# Patient Record
Sex: FEMALE | Race: BLACK OR AFRICAN AMERICAN | Employment: UNEMPLOYED | URBAN - METROPOLITAN AREA
[De-identification: names, ages, dates, MRNs, and addresses within clinical notes are randomized per-mention and may not be internally consistent; named-entity substitution may affect disease eponyms.]

---

## 2020-02-01 ENCOUNTER — HOSPITAL ENCOUNTER (INPATIENT)
Age: 20
LOS: 9 days | Discharge: HOME | DRG: 750 | End: 2020-02-10
Attending: PSYCHIATRY & NEUROLOGY | Admitting: PSYCHIATRY & NEUROLOGY
Payer: COMMERCIAL

## 2020-02-01 PROBLEM — F20.0 SCHIZOPHRENIA, PARANOID (HCC): Status: ACTIVE | Noted: 2020-02-01

## 2020-02-01 PROCEDURE — 74011250637 HC RX REV CODE- 250/637: Performed by: PSYCHIATRY & NEUROLOGY

## 2020-02-01 PROCEDURE — 65220000003 HC RM SEMIPRIVATE PSYCH

## 2020-02-01 RX ORDER — DIPHENHYDRAMINE HYDROCHLORIDE 50 MG/ML
50 INJECTION, SOLUTION INTRAMUSCULAR; INTRAVENOUS
Status: DISCONTINUED | OUTPATIENT
Start: 2020-02-01 | End: 2020-02-03

## 2020-02-01 RX ORDER — SERTRALINE HYDROCHLORIDE 50 MG/1
50 TABLET, FILM COATED ORAL DAILY
COMMUNITY
Start: 2020-02-01 | End: 2020-02-10

## 2020-02-01 RX ORDER — TRAZODONE HYDROCHLORIDE 50 MG/1
50 TABLET ORAL
Status: DISCONTINUED | OUTPATIENT
Start: 2020-02-01 | End: 2020-02-10 | Stop reason: HOSPADM

## 2020-02-01 RX ORDER — OLANZAPINE 5 MG/1
5 TABLET ORAL
Status: DISCONTINUED | OUTPATIENT
Start: 2020-02-01 | End: 2020-02-10 | Stop reason: HOSPADM

## 2020-02-01 RX ORDER — OXCARBAZEPINE 300 MG/1
300 TABLET, FILM COATED ORAL 2 TIMES DAILY
COMMUNITY
Start: 2020-02-01 | End: 2020-02-10

## 2020-02-01 RX ORDER — OXCARBAZEPINE 150 MG/1
300 TABLET, FILM COATED ORAL 2 TIMES DAILY
Status: DISCONTINUED | OUTPATIENT
Start: 2020-02-01 | End: 2020-02-10 | Stop reason: HOSPADM

## 2020-02-01 RX ORDER — HYDROXYZINE 25 MG/1
50 TABLET, FILM COATED ORAL
Status: DISCONTINUED | OUTPATIENT
Start: 2020-02-01 | End: 2020-02-03

## 2020-02-01 RX ORDER — TRAZODONE HYDROCHLORIDE 50 MG/1
50 TABLET ORAL
COMMUNITY
End: 2020-02-10

## 2020-02-01 RX ORDER — ADHESIVE BANDAGE
30 BANDAGE TOPICAL DAILY PRN
Status: DISCONTINUED | OUTPATIENT
Start: 2020-02-01 | End: 2020-02-03

## 2020-02-01 RX ORDER — QUETIAPINE FUMARATE 300 MG/1
300 TABLET, FILM COATED ORAL
Status: ON HOLD | COMMUNITY
End: 2020-02-02

## 2020-02-01 RX ORDER — ACETAMINOPHEN 325 MG/1
650 TABLET ORAL
Status: DISCONTINUED | OUTPATIENT
Start: 2020-02-01 | End: 2020-02-03

## 2020-02-01 RX ORDER — BENZTROPINE MESYLATE 1 MG/1
1 TABLET ORAL
Status: DISCONTINUED | OUTPATIENT
Start: 2020-02-01 | End: 2020-02-03

## 2020-02-01 RX ORDER — ARIPIPRAZOLE LAUROXIL 882 MG/3.2ML
3.2 INJECTION, SUSPENSION, EXTENDED RELEASE INTRAMUSCULAR
COMMUNITY
Start: 2020-01-30 | End: 2020-02-10

## 2020-02-01 RX ORDER — LORAZEPAM 2 MG/ML
1 INJECTION INTRAMUSCULAR
Status: DISCONTINUED | OUTPATIENT
Start: 2020-02-01 | End: 2020-02-03

## 2020-02-01 RX ORDER — HALOPERIDOL 5 MG/ML
5 INJECTION INTRAMUSCULAR
Status: DISCONTINUED | OUTPATIENT
Start: 2020-02-01 | End: 2020-02-03

## 2020-02-01 RX ORDER — HYDROXYZINE PAMOATE 25 MG/1
25 CAPSULE ORAL
COMMUNITY
Start: 2020-01-28 | End: 2020-02-10

## 2020-02-01 RX ADMIN — QUETIAPINE FUMARATE 300 MG: 200 TABLET ORAL at 22:27

## 2020-02-01 RX ADMIN — OXCARBAZEPINE 300 MG: 150 TABLET, FILM COATED ORAL at 19:32

## 2020-02-01 RX ADMIN — HYDROXYZINE HYDROCHLORIDE 50 MG: 25 TABLET, FILM COATED ORAL at 19:32
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
## 2020-02-01 NOTE — PROGRESS NOTES
Problem: Anxiety-Behavioral Health (Adult/Pediatric)  Goal: *STG: Remains safe in hospital  Outcome: Progressing Towards Goal  Goal: *STG: Seeks staff when feelings of anxiety and fear arise  Outcome: Progressing Towards Goal
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
## 2020-02-02 PROCEDURE — 65220000003 HC RM SEMIPRIVATE PSYCH

## 2020-02-02 PROCEDURE — 74011250637 HC RX REV CODE- 250/637: Performed by: PSYCHIATRY & NEUROLOGY

## 2020-02-02 RX ORDER — QUETIAPINE 300 MG/1
300 TABLET, FILM COATED, EXTENDED RELEASE ORAL
Status: ON HOLD | COMMUNITY
End: 2020-02-03

## 2020-02-02 RX ORDER — QUETIAPINE 300 MG/1
300 TABLET, FILM COATED, EXTENDED RELEASE ORAL
Status: DISCONTINUED | OUTPATIENT
Start: 2020-02-02 | End: 2020-02-05

## 2020-02-02 RX ADMIN — QUETIAPINE FUMARATE 300 MG: 300 TABLET, EXTENDED RELEASE ORAL at 21:02

## 2020-02-02 RX ADMIN — OXCARBAZEPINE 300 MG: 150 TABLET, FILM COATED ORAL at 09:04

## 2020-02-02 RX ADMIN — OXCARBAZEPINE 300 MG: 150 TABLET, FILM COATED ORAL at 16:56

## 2020-02-02 NOTE — PROGRESS NOTES
0900 Sitting in the dayroom talking with a peer. Social, pleasant mood. Smiling at times. Reports she feels depressed. \"Sort of. I can see myself dead. \" Cooperative with vital signs and medications. 1130 Pt in the dayroom eating lunch, talking with peers. 1330 Exercising in her bedroom. No issues voiced. Pleasant. [address] Talking with a peer in the dayroom. Calm, smiling. 1800 Pt ate dinner. Remains sitting in the dayroom talking and watching TV.

## 2020-02-02 NOTE — GROUP NOTE
[facility] GROUP DOCUMENTATION INDIVIDUAL Group Therapy Note Date: 2/1/2020 Group Start Time: 1500 Group End Time: 6845 Group Topic: Process Group - Inpatient [facility] Cherelle Mendoza [facility] GROUP DOCUMENTATION GROUP Group Therapy Note Attendees: 5 Attendance: Attended Patient's Goal:  Pt identified areas where they feel pride, love, areas that need assistance, and things that cause worry or concern. Interventions/techniques: Art integration, Challenged, Informed and Promoted peer support Follows Directions: Followed directions Interactions: Interacted appropriately Mental Status: Elevated Behavior/appearance: Cooperative Goals Achieved: Able to listen to others and Able to reflect/comment on own behavior Additional Notes:  Pt stated that she is often worried about those that love her betraying her, even though they never have. Pt stated \"they keep loving me and I don't know why\" Kendra Ashraf

## 2020-02-02 NOTE — BH NOTES
Behavioral Health Interdisciplinary Rounds     Patient Name: Luis Brock  Age: 21 y.o. Room/Bed:  314/01  Primary Diagnosis: [facility]   Admission Status: Voluntary     Readmission within 30 days: no  Power of  in place: no  Patient requires a blocked bed: no          Reason for blocked bed:     Sleep hours: 8       Participation in Care/Groups:  no  Medication Compliant?: Yes  PRNS (last 24 hours):  Antianxiety    Restraints (last 24 hours):  no  Substance Abuse:  no    24 hour chart check complete: yes     Patient goal(s) for today:   Treatment team focus/goals:   Progress note:     LOS:  1  Expected LOS:     Financial concerns/prescription coverage:  no  Family contact:       Family requesting physician contact today:  no  Discharge plan:   Access to weapons: no        Outpatient provider(s):   Patient's preferred phone number for follow up call:     Participating treatment team members: Luis Brock (assigned SW),

## 2020-02-02 NOTE — PROGRESS NOTES
Problem: Anxiety-Behavioral Health (Adult/Pediatric)  Goal: *STG: Remains safe in hospital  Outcome: Progressing Towards Goal  Goal: *STG/LTG: Complies with medication therapy  Outcome: Progressing Towards Goal

## 2020-02-02 NOTE — PROGRESS NOTES
Problem: Anxiety-Behavioral Health (Adult/Pediatric)  Goal: *STG: Participates in treatment plan  Outcome: Progressing Towards Goal  Goal: *STG: Remains safe in hospital  Outcome: Progressing Towards Goal  Goal: *STG: Seeks staff when feelings of anxiety and fear arise  Outcome: Progressing Towards Goal  Goal: *STG: Demonstrates decrease in ritualistic behavior  Outcome: Progressing Towards Goal  Goal: *STG: Demonstrates effective anxiety reduction strategies  Outcome: Progressing Towards Goal  Goal: *STG/LTG: Complies with medication therapy  Outcome: Progressing Towards Goal     Progressing.
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
## 2020-02-03 PROCEDURE — 65220000003 HC RM SEMIPRIVATE PSYCH

## 2020-02-03 PROCEDURE — 74011250637 HC RX REV CODE- 250/637: Performed by: PSYCHIATRY & NEUROLOGY

## 2020-02-03 RX ORDER — LORAZEPAM 2 MG/ML
1 INJECTION INTRAMUSCULAR
Status: DISCONTINUED | OUTPATIENT
Start: 2020-02-03 | End: 2020-02-10 | Stop reason: HOSPADM

## 2020-02-03 RX ORDER — BENZTROPINE MESYLATE 1 MG/1
1 TABLET ORAL
Status: DISCONTINUED | OUTPATIENT
Start: 2020-02-03 | End: 2020-02-03

## 2020-02-03 RX ORDER — QUETIAPINE FUMARATE 300 MG/1
300 TABLET, FILM COATED ORAL
COMMUNITY
End: 2020-02-10

## 2020-02-03 RX ORDER — HALOPERIDOL 5 MG/ML
5 INJECTION INTRAMUSCULAR
Status: DISCONTINUED | OUTPATIENT
Start: 2020-02-03 | End: 2020-02-10 | Stop reason: HOSPADM

## 2020-02-03 RX ORDER — ADHESIVE BANDAGE
30 BANDAGE TOPICAL DAILY PRN
Status: DISCONTINUED | OUTPATIENT
Start: 2020-02-03 | End: 2020-02-03

## 2020-02-03 RX ORDER — ACETAMINOPHEN 325 MG/1
650 TABLET ORAL
Status: DISCONTINUED | OUTPATIENT
Start: 2020-02-03 | End: 2020-02-10 | Stop reason: HOSPADM

## 2020-02-03 RX ORDER — HYDROXYZINE 25 MG/1
50 TABLET, FILM COATED ORAL
Status: DISCONTINUED | OUTPATIENT
Start: 2020-02-03 | End: 2020-02-03

## 2020-02-03 RX ORDER — HALOPERIDOL 5 MG/ML
5 INJECTION INTRAMUSCULAR
Status: DISCONTINUED | OUTPATIENT
Start: 2020-02-03 | End: 2020-02-03

## 2020-02-03 RX ORDER — DIPHENHYDRAMINE HYDROCHLORIDE 50 MG/ML
50 INJECTION, SOLUTION INTRAMUSCULAR; INTRAVENOUS
Status: DISCONTINUED | OUTPATIENT
Start: 2020-02-03 | End: 2020-02-03

## 2020-02-03 RX ORDER — LORAZEPAM 2 MG/ML
1 INJECTION INTRAMUSCULAR
Status: DISCONTINUED | OUTPATIENT
Start: 2020-02-03 | End: 2020-02-03

## 2020-02-03 RX ORDER — TRAZODONE HYDROCHLORIDE 50 MG/1
50 TABLET ORAL
Status: DISCONTINUED | OUTPATIENT
Start: 2020-02-03 | End: 2020-02-03

## 2020-02-03 RX ORDER — OLANZAPINE 5 MG/1
5 TABLET ORAL
Status: DISCONTINUED | OUTPATIENT
Start: 2020-02-03 | End: 2020-02-03

## 2020-02-03 RX ORDER — DIPHENHYDRAMINE HYDROCHLORIDE 50 MG/ML
50 INJECTION, SOLUTION INTRAMUSCULAR; INTRAVENOUS
Status: DISCONTINUED | OUTPATIENT
Start: 2020-02-03 | End: 2020-02-10 | Stop reason: HOSPADM

## 2020-02-03 RX ORDER — ACETAMINOPHEN 325 MG/1
650 TABLET ORAL
Status: DISCONTINUED | OUTPATIENT
Start: 2020-02-03 | End: 2020-02-03

## 2020-02-03 RX ORDER — BENZTROPINE MESYLATE 1 MG/1
1 TABLET ORAL 2 TIMES DAILY
Status: DISCONTINUED | OUTPATIENT
Start: 2020-02-03 | End: 2020-02-03

## 2020-02-03 RX ORDER — HYDROXYZINE 25 MG/1
50 TABLET, FILM COATED ORAL
Status: DISCONTINUED | OUTPATIENT
Start: 2020-02-03 | End: 2020-02-10 | Stop reason: HOSPADM

## 2020-02-03 RX ORDER — ADHESIVE BANDAGE
30 BANDAGE TOPICAL DAILY PRN
Status: DISCONTINUED | OUTPATIENT
Start: 2020-02-03 | End: 2020-02-10 | Stop reason: HOSPADM

## 2020-02-03 RX ORDER — BENZTROPINE MESYLATE 1 MG/1
1 TABLET ORAL
Status: DISCONTINUED | OUTPATIENT
Start: 2020-02-03 | End: 2020-02-10 | Stop reason: HOSPADM

## 2020-02-03 RX ADMIN — HYDROXYZINE HYDROCHLORIDE 50 MG: 25 TABLET, FILM COATED ORAL at 21:08

## 2020-02-03 RX ADMIN — TRAZODONE HYDROCHLORIDE 50 MG: 50 TABLET ORAL at 21:08

## 2020-02-03 RX ADMIN — OXCARBAZEPINE 300 MG: 150 TABLET, FILM COATED ORAL at 08:40

## 2020-02-03 RX ADMIN — OXCARBAZEPINE 300 MG: 150 TABLET, FILM COATED ORAL at 16:22

## 2020-02-03 RX ADMIN — MAGNESIUM HYDROXIDE 30 ML: 400 SUSPENSION ORAL at 21:08

## 2020-02-03 RX ADMIN — QUETIAPINE FUMARATE 300 MG: 300 TABLET, EXTENDED RELEASE ORAL at 21:08

## 2020-02-03 NOTE — PROGRESS NOTES
[facility] Admission Pharmacy Medication Reconciliation     Recommendations/Findings:   1) Per pharmacist at [facility], patient received Aristada Initio 675 mg and Aristada 882 mg IM injections on 1/23/20 while admitted at their facility. The following changes were made to the PTA medication list:    Additions:   o None   Modifications:   o Dosage form of quetiapine changed from ER to immediate release  o Hydroxyzine frequency changed to QID PRN  o Frequency added for sertraline  o Frequency added for Aristada   Deletions:   o None    Total Time Spent: 60 minutes    Information obtained from: Discharge summary from [facility], [facility] inpatient pharmacy, [facility] [facility] [place]    Patient allergies: Allergies as of 02/01/2020    (No Known Allergies)       Prior to Admission Medications   Prescriptions Last Dose Informant Patient Reported? Taking? ARISTADA 882 mg/3.2 mL injection 1/23/2020 Other Yes Yes   Sig: 3.2 mL by IntraMUSCular route every thirty (30) days. Indications: schizophrenia   OXcarbazepine (TRILEPTAL) 300 mg tablet  Other Yes Yes   Sig: Take 300 mg by mouth two (2) times a day. QUEtiapine (SEROQUEL) 300 mg tablet   Yes Yes   Sig: Take 300 mg by mouth nightly. Indications: schizophrenia   hydrOXYzine pamoate (VISTARIL) 25 mg capsule  Other Yes Yes   Sig: Take 25 mg by mouth four (4) times daily as needed for Anxiety. sertraline (ZOLOFT) 50 mg tablet  Other Yes Yes   Sig: Take 50 mg by mouth daily. Indications: anxiousness associated with depression   traZODone (DESYREL) 50 mg tablet  Other Yes Yes   Sig: Take 50 mg by mouth nightly.       Facility-Administered Medications: None       Thank you,  [facility]  Desk: [phone] ([id number])  Pharmacy: [phone] ([id number])
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
## 2020-02-03 NOTE — PROGRESS NOTES
Problem: Anxiety-Behavioral Health (Adult/Pediatric)  Goal: *STG: Participates in treatment plan  Outcome: Progressing Towards Goal  Goal: *STG: Remains safe in hospital  Outcome: Progressing Towards Goal  Goal: *STG: Attends activities and groups  Outcome: Progressing Towards Goal  Goal: *STG: Demonstrates effective anxiety reduction strategies  Outcome: Progressing Towards Goal     Problem: Falls - Risk of  Goal: *Absence of Falls  Description  Document Raudel Fall Risk and appropriate interventions in the flowsheet.   Outcome: Progressing Towards Goal  Note:   Fall Risk Interventions: Patient wearing non-slip socks with grippers

## 2020-02-03 NOTE — BH NOTES
1930  Received report from REID DAMON. Assumed care of the patient. 2000  Pt in room sitting on her bed. Pt reports mood as \"fine. \"  Pt currently denies anxiety/depression/SI/HI/AVH/pain. Pt asking for evening medication, informed her I could give it to her at 2100 and pt verbalized understanding. 2100  Pt in day flynn watching the super bowl with peers. Pt compliant with evening medication. 2200  Pt in day flynn watching tv    2300  Pt in bed asleep    0000  Pt in bed asleep    0100  Pt in bed asleep    0200  Pt in bed asleep    0300  Pt in bed asleep    0400  Pt in bed asleep    0500  Pt in bed asleep    0600  Pt in bed asleep    0630  Pt has slept approximately 7 1/2 hours so far this shift.

## 2020-02-03 NOTE — GROUP NOTE
GRACIE  GROUP DOCUMENTATION INDIVIDUAL Group Therapy Note Date: 2/3/2020 Group Start Time: 1500 Group End Time: 0164 Group Topic: Recreational/Music Therapy [address] [facility] 3 ACUTE BEHAV [facility] [place], [address] [place], Susan. \Hospital Has a New Name and Outlook.\"" GROUP DOCUMENTATION GROUP Group Therapy Note Attendees: 6 Attendance: Did not attend Patient's Goal: Interventions/techniques: 
Sabina Weston

## 2020-02-03 NOTE — BH NOTES
GROUP THERAPY PROGRESS NOTE    Patient is participating in coping skills group. Group time: 50 minutes    Personal goal for participation: Learn coping skills to reduce negative emotions    Goal orientation: Personal    Group therapy participation: active    Therapeutic interventions reviewed and discussed: Group discussion on why being bored can be a problem and the consequences of boredom that patient have experienced. Patient discussed issues they have had with being bored and ways they have tried to combat boredom. This lead to discussion of coping skills for negative emotions and ways to feel better that each patient has tried or that they have heard of. Discussion of activities that help with boredom, challenges, potential solutions and plans. Impression of participation: Kay Handley was present for group. She reported that she enjoys listening to music. She was quiet with little interaction with others during group.     Tom Bahena LPC [facility]
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
## 2020-02-03 NOTE — BH NOTES
[id number] Patient in day room playing games with peers. Medication compliant. Intermittently speaking with staff and peers. Patient doing situps in room and discussing frequency and importance of exercising. [id number] Patient ambulating in hallway, talking with staff. Patient attended groups and appears interactive in discussions. No adverse behaviors noted at this time. [phone] Patient ate her lunch without problem. She is ambulating between room and day room quietly. Responding to internal stimuli, auditory hallucinations. Patient's affect is pleasant and calm. Whispering quietly to self.  [id number] Patient is calm and pleasant, very polite to staff. Patient participated in group and spoke with pharmacist. No adverse behaviors noted. 1310 Patient pacing in room responding to internal stimuli. Conversation is appropriate for setting and calm. [id number] Patient resting in bed. Medication compliant. No adverse behaviors at this time. 1800 Patient ambulating between hallway and room. Responding to internal stimuli verbally and shadow boxing. Patient does not appear agitated. Intermittently laughing and speaking.

## 2020-02-03 NOTE — PROGRESS NOTES
Laboratory monitoring for mood stabilizer and antipsychotics:    Recommended baseline monitoring has been completed based on this patient's current medication regimen. The following labs were completed at transferring facility on 1/31/20: CMP, CBC, BAL, urine drug screen (negative), urinalysis, urine HCG (negative). Please see records in paper chart in nurse's station/scanned in media tab for results. Per [facility] tab, last A1c was 5.4% on 11/21/19. The patient is currently taking the following medication(s):   Current Facility-Administered Medications   Medication Dose Route Frequency    nicotine (NICODERM CQ) 7 mg/24 hr patch 1 Patch  1 Patch TransDERmal DAILY    [START ON 2/21/2020] ARIPiprazole lauroxil (ARISTADA) 882 mg/3.2 mL ER injection 882 mg  882 mg IntraMUSCular Q30D    QUEtiapine SR (SEROquel XR) tablet 300 mg  300 mg Oral QHS    OXcarbazepine (TRILEPTAL) tablet 300 mg  300 mg Oral BID       Height, Weight, BMI Estimation  Estimated body mass index is 37.01 kg/m² as calculated from the following:    Height as of this encounter: 172.7 cm (68\"). Weight as of this encounter: 110.4 kg (243 lb 6.4 oz).      Renal Function, Hepatic Function and Chemistry  See paperwork in chart/scanned in media tab for results    Hematology  See paperwork in chart/scanned in media tab for results    Lipids  Lab Results   Component Value Date/Time    Cholesterol, total 147 02/04/2020 05:15 AM    HDL Cholesterol 54 02/04/2020 05:15 AM    LDL, calculated 80.6 02/04/2020 05:15 AM    Triglyceride 62 02/04/2020 05:15 AM    CHOL/HDL Ratio 2.7 02/04/2020 05:15 AM       Thyroid Function  Lab Results   Component Value Date/Time    TSH 1.76 02/04/2020 05:15 AM     Vitals  Visit Vitals  /82 (BP 1 Location: Right arm, BP Patient Position: Sitting)   Pulse 61   Temp 98.3 °F (36.8 °C)   Resp 18   Ht 172.7 cm (68\")   Wt 110.4 kg (243 lb 6.4 oz)   SpO2 100%   Breastfeeding No   BMI 37.01 kg/m²     Kandi Contreras PharmD, BCPS  [phone] (pharmacy)

## 2020-02-03 NOTE — GROUP NOTE
GRACIE  GROUP DOCUMENTATION INDIVIDUAL Group Therapy Note Date: 2/3/2020 Group Start Time: 1100 Group End Time: 1200 Group Topic: Topic Group [facility] 3 ACUTE BEHAV [facility], [address] [facility] GROUP DOCUMENTATION GROUP Group Therapy Note Attendees: 6 Attendance: Did not attend Patient's Goal: Interventions/techniques [place]

## 2020-02-04 LAB
CHOLEST SERPL-MCNC: 147 MG/DL
HDLC SERPL-MCNC: 54 MG/DL
HDLC SERPL: 2.7 {RATIO} (ref 0–5)
LDLC SERPL CALC-MCNC: 80.6 MG/DL (ref 0–100)
LIPID PROFILE,FLP: NORMAL
TRIGL SERPL-MCNC: 62 MG/DL (ref ?–150)
TSH SERPL DL<=0.05 MIU/L-ACNC: 1.76 UIU/ML (ref 0.36–3.74)
VLDLC SERPL CALC-MCNC: 12.4 MG/DL

## 2020-02-04 PROCEDURE — 80061 LIPID PANEL: CPT

## 2020-02-04 PROCEDURE — 84443 ASSAY THYROID STIM HORMONE: CPT

## 2020-02-04 PROCEDURE — 36415 COLL VENOUS BLD VENIPUNCTURE: CPT

## 2020-02-04 PROCEDURE — 65220000003 HC RM SEMIPRIVATE PSYCH

## 2020-02-04 PROCEDURE — 74011250637 HC RX REV CODE- 250/637: Performed by: PSYCHIATRY & NEUROLOGY

## 2020-02-04 RX ORDER — NICOTINE 7MG/24HR
1 PATCH, TRANSDERMAL 24 HOURS TRANSDERMAL DAILY
Status: DISCONTINUED | OUTPATIENT
Start: 2020-02-04 | End: 2020-02-10 | Stop reason: HOSPADM

## 2020-02-04 RX ADMIN — OXCARBAZEPINE 300 MG: 150 TABLET, FILM COATED ORAL at 08:33

## 2020-02-04 RX ADMIN — QUETIAPINE FUMARATE 300 MG: 300 TABLET, EXTENDED RELEASE ORAL at 21:17

## 2020-02-04 RX ADMIN — OXCARBAZEPINE 300 MG: 150 TABLET, FILM COATED ORAL at 16:53

## 2020-02-04 RX ADMIN — TRAZODONE HYDROCHLORIDE 50 MG: 50 TABLET ORAL at 21:17

## 2020-02-04 RX ADMIN — HYDROXYZINE HYDROCHLORIDE 50 MG: 25 TABLET, FILM COATED ORAL at 21:17

## 2020-02-04 NOTE — BH NOTES
GROUP THERAPY PROGRESS NOTE    Patient is participating in Substance abuse group. Group time: 45 minutes    Personal goal for participation: To understand addiction and relapse and identify triggers that increase urges and relapse. Goal orientation: Personal    Group therapy participation: active    Therapeutic interventions reviewed and discussed: Group discussion of substance use, abuse, and dependence and the urge cycle. Patients were able to explore their own urges to use various substances including cigarettes, alcohol, heroin, cocaine, and others. Group discussed how they feel when they are unable to use and ways substance use has hindered their lives. Triggers for use and coping skills to avoid use or manage symptoms until craving subsides were discussed. Those without addiction issues used the urges handout to write down and draw pictures of things that make them anxious, angry, or sad. Impression of participation: Elena Medina was very active in group. When asked what negative emotions she feels she described rain drops. With encouragement and prompting she was able to share that she feels like a rainstorm inside of her at times. This was equal to sadness and depression. She was able to verbalize occasional storms and thunder but mostly rain.     Lorenza Moya LPC LSATP [facility]
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
## 2020-02-04 NOTE — PROGRESS NOTES
Problem: Tobacco Use  Goal: *Tobacco use abstinence  Outcome: Progressing Towards Goal  Goal: *Knowledge of tobacco use cessation methods  Outcome: Progressing Towards Goal
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
## 2020-02-04 NOTE — GROUP NOTE
GRACIE  GROUP DOCUMENTATION INDIVIDUAL Group Therapy Note Date: 2/4/2020 Group Start Time: 1100 Group End Time: 1200 Group Topic: Topic Group [address] ACUTE BEHAV [facility], [address] GROUP DOCUMENTATION GROUP Group Therapy Note Attendees: 4 Attendance: Attended Patient's Goal:  To participate in self esteem booster Interventions/techniques: Supported-handout Follows Directions: Followed directions Interactions: Interacted appropriately Mental Status: Calm Behavior/appearance: Attentive and Cooperative Goals Achieved: Able to engage in interactions, Able to listen to others, Discussed coping and Discussed self-esteem issues Additional Notes:   
 
Milena Gallegos

## 2020-02-04 NOTE — PROGRESS NOTES
Problem: Anxiety-Behavioral Health (Adult/Pediatric)  Goal: *STG/LTG: Complies with medication therapy  Outcome: Progressing Towards Goal
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
## 2020-02-04 NOTE — PROGRESS NOTES
Problem: Anxiety-Behavioral Health (Adult/Pediatric)  Goal: *STG: Demonstrates effective anxiety reduction strategies  Outcome: Progressing Towards Goal  Note:   Patient exercises frequently as a coping mechanism. Goal: *STG/LTG: Complies with medication therapy  Outcome: Progressing Towards Goal     Problem: Falls - Risk of  Goal: *Absence of Falls  Description  Document Clide Failing Fall Risk and appropriate interventions in the flowsheet. Outcome: Progressing Towards Goal  Note:   Fall Risk Interventions:        Patient consistently compliant with gripper socks.

## 2020-02-04 NOTE — GROUP NOTE
GRACIE  GROUP DOCUMENTATION INDIVIDUAL Group Therapy Note Date: 2/4/2020 Group Start Time: 1500 Group End Time: 4019 Group Topic: Recreational/Music Therapy [facility] [address] ACUTE BEHAV [facility] [place], [address] GROUP DOCUMENTATION GROUP Group Therapy Note Attendees: 7 Attendance: Attended Patient's Goal:  To concentrate on selected task Interventions/techniques: Supported-crafts,games,music Follows Directions: Followed directions Interactions: Interacted appropriately Mental Status: Calm Behavior/appearance: Attentive and Cooperative Goals Achieved: Able to engage in interactions and Able to listen to others-completed selected task Additional Notes:   
 
Roberto Larson

## 2020-02-04 NOTE — BH NOTES
1900- Assumed Care and received report from off going staff.     1922-At the beginning of the shift patient in the dayroom having a conversation with peers. Calm and cooperative. At this time denies SI,HI and AVH.    2030-Tolerated bedtime snack in the dayroom with peers. 2110-Showed signs in the the hallway of [address] [place] [address].     2108-Tolerated scheduled bedtime medication and PRN's for anxiety, sleep and constipation. 0530- labs drawn this am.      Q15 minutes rounds for safety continues. Resting in bed with eyes close. Approx.  7 hours of sleep

## 2020-02-05 PROCEDURE — 74011250637 HC RX REV CODE- 250/637: Performed by: PSYCHIATRY & NEUROLOGY

## 2020-02-05 PROCEDURE — 65220000003 HC RM SEMIPRIVATE PSYCH

## 2020-02-05 RX ORDER — QUETIAPINE 300 MG/1
600 TABLET, FILM COATED, EXTENDED RELEASE ORAL
Status: DISCONTINUED | OUTPATIENT
Start: 2020-02-05 | End: 2020-02-10 | Stop reason: HOSPADM

## 2020-02-05 RX ADMIN — OXCARBAZEPINE 300 MG: 150 TABLET, FILM COATED ORAL at 16:25

## 2020-02-05 RX ADMIN — OXCARBAZEPINE 300 MG: 150 TABLET, FILM COATED ORAL at 08:37

## 2020-02-05 RX ADMIN — QUETIAPINE FUMARATE 600 MG: 300 TABLET, EXTENDED RELEASE ORAL at 21:07
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
## 2020-02-05 NOTE — PROGRESS NOTES
Basilia Falcon  actively participated in Spirituality Group about Hope on the [facility] unit. .  Ankita Dent EdD MDiv     For  Assistance Page [phone]

## 2020-02-05 NOTE — BH NOTES
0700 Received report on patient and assumed care  [phone]  Patient denies SI/HI and delusions/hallucinations. Patient is pleasant and cooperative. Verbalized her goal for today as, \"improving attitude to get better. \" She was medication/diet compliant. Attended groups and participated. No adverse behaviors noted at this time. [id number] Patient is currently attending groups and displaying profound insight into personal struggles. 5198-0110 Patient attended groups, and interacted with peers appropriately. Tolerated lunch well. No adverse behaviors noted at this time. 3659-1252 Patient attended and participated in groups. She stated she had thoughts that she wanted to get down onto paper. Patient was was able to write down thoughts and verbally expressed, \"feeling much better now that she was able to get some things down on paper. \"  3240-4547 Patient ate dinner and is interacting with peers appropriately. Showing good insight, verbalizing that in order to be well once she is on the outside, she needs to make sure that she takes her medication regularly. No adverse behaviors noted at this time.

## 2020-02-05 NOTE — PROGRESS NOTES
Problem: Paranoid Ideation  Goal: *LTG: Show more trust in others by speaking positively of them & reporting comfort in socializing  Outcome: Progressing Towards Goal  Goal: *LTG: Interact with others without defensiveness or anger  Outcome: Progressing Towards Goal  Goal: *LTG: Report reduced vigilance & suspicion around others as well as more relaxed, trusting, & open interaction  Outcome: Progressing Towards Goal  Goal: *STG: Make verbal connection between fears of others & own feelings of inadequacy  Outcome: Progressing Towards Goal  Goal: *STG: Agree to collaborate with therapist in probing feelings of vulnerability  Outcome: Progressing Towards Goal  Goal: *STG: Stop being accusatory of others  Outcome: Progressing Towards Goal  Goal: *Patient Specific Goal (EDIT GOAL, INSERT TEXT)  Outcome: Progressing Towards Goal  Note:   Patient specific goal is to improve attitude in general.

## 2020-02-05 NOTE — BH NOTES
1900- Assumed Care and received report from off going staff.     1925-At the beginning of the shift patient in her room resting in bed with eyes close. Calm and cooperative. At this time denies SI,HI and AVH.    2030-Accepted bedtime snack      2117-Tolerated scheduled bedtime medication and PRN's for anxiety and sleep      Q15 minutes rounds for safety continues.  Resting in bed with eyes close.     Approx 8 hours of sleep

## 2020-02-05 NOTE — BH NOTES
GROUP THERAPY PROGRESS NOTE    Patient is participating in Coping Skills group. Group time: 45 minutes    Personal goal for participation: To come up with a plan of things to help maintain wellness of the whole person    Goal orientation: Personal    Group therapy participation: active    Therapeutic interventions reviewed and discussed: Group discussion of the wellness wheel and how they plan to balance themselves by looking at spiritual, physical, occupations, intellectual, social/family, and emotional aspects of their lives. Impression of participation: Malcolm Goodson shared that she needs to find a job but would like to go back to school to better herself and finds that she is not smart. Discussed that there are different types of emotional intelligence and maybe she needs to find what her learning style is.     Shay Oodm LPC LSATP CSAC

## 2020-02-05 NOTE — GROUP NOTE
GRACIE  GROUP DOCUMENTATION INDIVIDUAL Group Therapy Note Date: 2/5/2020 Group Start Time: 1100 Group End Time: 1200 Group Topic: Activity Therapy [facility] [place] [address] ACUTE BEHAV [facility], [address], [address]. \Hospital Has a New Name and Outlook.\"" GROUP DOCUMENTATION GROUP Group Therapy Note Attendees: 7 Attendance: Attended Patient's Goal:  To participate in relaxation activity Interventions/techniques: Supported-favorite ways to relax Follows Directions: Followed directions Interactions: Interacted appropriately Mental Status: Calm Behavior/appearance: Attentive, Cooperative and Needed prompting Goals Achieved: Able to engage in interactions and Able to listen to others Additional Notes:   
 
Fani Barbosa
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
## 2020-02-05 NOTE — BH NOTES
Behavioral Health Treatment Team Note     Patient goal(s) for today: Engage in unit activities. Treatment team focus/goals: Dispo planning. Progress note:  Pt is calm, cooperative and engaging in group activities. Pt states she will be staying in [place] and will find \"work\". No return calls from  or mother at this time. LOS:  4  Expected LOS: TBD     Financial concerns/prescription coverage:  medicaid   Date of last family contact: Unable to leave voicemail for mother Alessandra Harris [phone]     Family requesting physician contact today: No  Discharge plan: TBD  Guns in the home: None involved. Outpatient provider(s): [facility]. Laury Etienne [phone]    Participating treatment team members: Terrence Mcnally, JOSE ANTONIO Woods and Dr. Kirk Villalobos.

## 2020-02-05 NOTE — GROUP NOTE
GRACIE  GROUP DOCUMENTATION INDIVIDUAL Group Therapy Note Date: 2/5/2020 Group Start Time: 1000 Group End Time: 1100 Group Topic: Topic Group [facility] 3 ACUTE BEHAV [facility] [place], [address] GROUP DOCUMENTATION GROUP Group Therapy Note Attendees: 7 Attendance: Attended Patient's Goal:  To participate in mental health journey game Interventions/techniques: Supported-choices in recovery Follows Directions: Followed directions Interactions: Interacted appropriately Mental Status: Calm Behavior/appearance: Attentive and Cooperative Goals Achieved: Able to engage in interactions, Able to listen to others and Discussed coping Additional Notes:   
Verlan Schirmer

## 2020-02-05 NOTE — PROGRESS NOTES
Problem: Anxiety-Behavioral Health (Adult/Pediatric)  Goal: *STG: Seeks staff when feelings of anxiety and fear arise  Outcome: Progressing Towards Goal  Goal: *STG: Attends activities and groups  Outcome: Progressing Towards Goal  Goal: *STG: Demonstrates decrease in ritualistic behavior  Outcome: Progressing Towards Goal  Note:   Patient has decreased perseveration about situps.   Goal: *STG/LTG: Complies with medication therapy  Outcome: Progressing Towards Goal     Problem: Paranoid Ideation  Goal: *LTG: Interact with others without defensiveness or anger  2/5/2020 1706 by Nhung Lynch RN  Outcome: Progressing Towards Goal  2/5/2020 0941 by Nhung Lynch RN  Outcome: Progressing Towards Goal  Goal: *LTG: Report reduced vigilance & suspicion around others as well as more relaxed, trusting, & open interaction  2/5/2020 1706 by Nhung Lynch RN  Outcome: Progressing Towards Goal  2/5/2020 0941 by Nhung Lynch RN  Outcome: Progressing Towards Goal  Goal: *STG: Make verbal connection between fears of others & own feelings of inadequacy  Outcome: Progressing Towards Goal  Goal: *STG: Agree to collaborate with therapist in probing feelings of vulnerability  Outcome: Progressing Towards Goal  Goal: *STG: Stop being accusatory of others  2/5/2020 1706 by Nhung Lynch RN  Outcome: Progressing Towards Goal  2/5/2020 0941 by Nhung Lynch RN  Outcome: Progressing Towards Goal  Goal: *STG:Report interacting socially without fear or suspicion  Outcome: Progressing Towards Goal  Goal: *Patient Specific Goal (EDIT GOAL, INSERT TEXT)  Outcome: Progressing Towards Goal  Note:   Patient specific goal is to improve attitude in general.

## 2020-02-05 NOTE — GROUP NOTE
GRACIE  GROUP DOCUMENTATION INDIVIDUAL Group Therapy Note Date: 2/5/2020 Group Start Time: 1400 Group End Time: 1500 Group Topic: Recreational/Music Therapy [facility] 3 ACUTE BEHAV [facility] [place], [address] [facility] GROUP DOCUMENTATION GROUP Group Therapy Note Attendees: 8 Attendance: Attended Patient's Goal:  To concentrate on selected task Interventions/techniques: Supported-crafts,games,music Follows Directions: Followed directions Interactions: Interacted appropriately Mental Status: Calm Behavior/appearance: Attentive and Cooperative Goals Achieved: Able to engage in interactions and Able to listen to others Additional Notes:   
 
Agata Mccloud

## 2020-02-05 NOTE — PROGRESS NOTES
Problem: Anxiety-Behavioral Health (Adult/Pediatric)  Goal: *STG: Seeks staff when feelings of anxiety and fear arise  Outcome: Progressing Towards Goal

## 2020-02-06 PROCEDURE — 65220000003 HC RM SEMIPRIVATE PSYCH

## 2020-02-06 PROCEDURE — 74011250637 HC RX REV CODE- 250/637: Performed by: PSYCHIATRY & NEUROLOGY

## 2020-02-06 RX ORDER — AMOXICILLIN 250 MG
1 CAPSULE ORAL DAILY
Status: DISCONTINUED | OUTPATIENT
Start: 2020-02-06 | End: 2020-02-10 | Stop reason: HOSPADM

## 2020-02-06 RX ADMIN — HYDROXYZINE HYDROCHLORIDE 50 MG: 25 TABLET, FILM COATED ORAL at 21:29

## 2020-02-06 RX ADMIN — SENNOSIDES AND DOCUSATE SODIUM 1 TABLET: 8.6; 5 TABLET ORAL at 11:04

## 2020-02-06 RX ADMIN — QUETIAPINE FUMARATE 600 MG: 300 TABLET, EXTENDED RELEASE ORAL at 21:29

## 2020-02-06 RX ADMIN — TRAZODONE HYDROCHLORIDE 50 MG: 50 TABLET ORAL at 21:29

## 2020-02-06 RX ADMIN — OXCARBAZEPINE 300 MG: 150 TABLET, FILM COATED ORAL at 16:59

## 2020-02-06 RX ADMIN — OXCARBAZEPINE 300 MG: 150 TABLET, FILM COATED ORAL at 08:36

## 2020-02-06 NOTE — GROUP NOTE
GRACIE  GROUP DOCUMENTATION INDIVIDUAL Group Therapy Note Date: 2/6/2020 Group Start Time: 1400 Group End Time: 1500 Group Topic: Recreational/Music Therapy [facility] [place] [address] ACUTE BEHAV [facility] [place], [address] GROUP DOCUMENTATION GROUP Group Therapy Note Attendees: 6 Attendance: Did not attend Patient's Goal: Interventions/techniques: 
Kiera Jack

## 2020-02-06 NOTE — GROUP NOTE
GRACIE  GROUP DOCUMENTATION INDIVIDUAL Group Therapy Note Date: 2/6/2020 Group Start Time: 1100 Group End Time: 1200 Group Topic: Topic Group [address] [facility] 3 ACUTE BEHAV [facility], [address] GROUP DOCUMENTATION GROUP Group Therapy Note Attendees: 6 Attendance: Attended Patient's Goal:  To participate in self esteem [address] game Interventions/techniques: Supported-things pertaining to self esteem Follows Directions: Followed directions Interactions: Interacted appropriately Mental Status: Calm and Flat Behavior/appearance: Attentive, Cooperative and Needed prompting Goals Achieved: Able to engage in interactions, Able to listen to others, Discussed coping and Discussed self-esteem issues Additional Notes:   
 
Leo Rausch

## 2020-02-06 NOTE — BH NOTES
GROUP THERAPY PROGRESS NOTE    Patient is participating in Process group. Group time: 45 minutes    Personal goal for participation: To discussion stressful situations and ways to cope. Goal orientation: Personal    Group therapy participation: active    Therapeutic interventions reviewed and discussed: Group discussion was focused issues that arise during the day and in normal life that can cause anxiety but also things that can increase anxiety to the point that it is a problem and ways to reduce stress through daily activities. Impression of participation: Evangelist Boucher shared that she tries to keep her environment calm and that is why she is trying to get a place separate from her mother. She reports trying to remember to breath and talk instead of getting upset and yelling.     Mimi Singh LPC [facility]
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
## 2020-02-06 NOTE — INTERDISCIPLINARY ROUNDS
Behavioral Health Interdisciplinary Rounds Patient Name: Jenni Leos  Age: 21 y.o. Room/Bed:  313/02 Primary Diagnosis: Schizophrenia, paranoid ([facility] [address] Admission Status: Voluntary Readmission within 30 days: no 
Power of  in place: no 
Patient requires a blocked bed: no           
Reason for blocked bed:  
 
Order for blocked bed obtained: no     
 
Sleep hours: 7 hours Participation in Care/Groups:  yes Medication Compliant?: Yes PRNS (last 24 hours): None Restraints (last 24 hours):  no 
Substance Abuse:  no   
24 hour chart check complete: yes Patient goal(s) for today: Engage in unit activities. Treatment team focus/goals: Dispo planning. Progress note:  Pt is calm, cooperative and engaging in group activities. Pt focused on living in [place]. Pt was provided with housing crisis hotline. SW spoke with [facility] [place] who reported only meeting pt twice and pt has not had a  before. SW has been unsuccessful in reaching mother and voicemail box is full.  
  
LOS:  4                        Expected LOS: TBD  
  
Financial concerns/prescription coverage:  medicaid Date of last family contact: Unable to leave voicemail for mother 2/6 Krystle Scriver [phone] Family requesting physician contact today: No 
Discharge plan: TBD Guns in the home: None involved. Outpatient provider(s): To be linked  
  
Participating treatment team members: Iveth Menendez MSW and Dr. Kayy Garcia.

## 2020-02-06 NOTE — BH NOTES
GROUP THERAPY PROGRESS NOTE    Patient is participating in Process group. Group time: 45 minutes    Personal goal for participation: To discussion relaxation techniques and complete a guide imagery exercise. .    Goal orientation: Personal    Group therapy participation: active    Therapeutic interventions reviewed and discussed: Group discussion was focused ways to relax when feeling stressed or having negative emotions. Group then participated in a guided imagery exercise. Impression of participation: Get Quezada shared that she enjoys listening to the calming music and was observed doing slow stretches in the corner that were not distracting to others.     Maria Dolores Hamilton LPC LSATP [facility]

## 2020-02-06 NOTE — BH NOTES
0730 - Pt report was received from the off going shift's RN.     0900 - Pt assessment was performed. Pt is alert and oriented. Pt is currently calm and cooperative. Pt denies hallucinations. 1430 - Pt is currently agitated and refusing to talk with staff. RN attempted to assess pt's needs, but pt is currently uncooperative and uninterested. RN is unsure what caused pt's frustration/agitation. 1855 - Pt has been compliant with meals and meds. Pt remains isolative after becoming upset earlier during the shift. She decided to eat her dinner in the flynn, closer to her room, and not with everyone else in the dayroom. Pt does not appear to be as agitated as she was earlier. She is currently cooperative with staff. Staff will continue to monitor pt's safety and offer support as needed.

## 2020-02-06 NOTE — BH NOTES
Patient sitting quietly in dayroom with peers Patient has no complaints and denies SI/HI/AVH  Slept 7 hours

## 2020-02-07 PROCEDURE — 65220000003 HC RM SEMIPRIVATE PSYCH

## 2020-02-07 PROCEDURE — 74011250637 HC RX REV CODE- 250/637: Performed by: PSYCHIATRY & NEUROLOGY

## 2020-02-07 RX ORDER — TRAZODONE HYDROCHLORIDE 50 MG/1
50 TABLET ORAL
Qty: 30 TAB | Refills: 1 | Status: SHIPPED | OUTPATIENT
Start: 2020-02-07

## 2020-02-07 RX ORDER — AMOXICILLIN 250 MG
1 CAPSULE ORAL DAILY
Qty: 30 TAB | Refills: 1 | Status: SHIPPED | OUTPATIENT
Start: 2020-02-08

## 2020-02-07 RX ORDER — OXCARBAZEPINE 300 MG/1
300 TABLET, FILM COATED ORAL 2 TIMES DAILY
Qty: 60 TAB | Refills: 1 | Status: SHIPPED | OUTPATIENT
Start: 2020-02-07

## 2020-02-07 RX ORDER — HYDROXYZINE 50 MG/1
50 TABLET, FILM COATED ORAL
Qty: 60 TAB | Refills: 1 | Status: SHIPPED | OUTPATIENT
Start: 2020-02-07 | End: 2020-04-07

## 2020-02-07 RX ORDER — QUETIAPINE 300 MG/1
600 TABLET, FILM COATED, EXTENDED RELEASE ORAL
Qty: 60 TAB | Refills: 1 | Status: SHIPPED | OUTPATIENT
Start: 2020-02-07

## 2020-02-07 RX ADMIN — TRAZODONE HYDROCHLORIDE 50 MG: 50 TABLET ORAL at 21:36

## 2020-02-07 RX ADMIN — QUETIAPINE FUMARATE 600 MG: 300 TABLET, EXTENDED RELEASE ORAL at 21:15

## 2020-02-07 RX ADMIN — SENNOSIDES AND DOCUSATE SODIUM 1 TABLET: 8.6; 5 TABLET ORAL at 08:40

## 2020-02-07 RX ADMIN — OXCARBAZEPINE 300 MG: 150 TABLET, FILM COATED ORAL at 08:40

## 2020-02-07 RX ADMIN — OXCARBAZEPINE 300 MG: 150 TABLET, FILM COATED ORAL at 16:37

## 2020-02-07 NOTE — PROGRESS NOTES
Diet as tolerated. Pt noted to be meal compliant. Hx notable only for elev BMI. Ht: 58  Wt: 243 lb 6.4 oz  BMI: 37.01 kg/(m^2) c/w obesity grade II.   Est energy needs: 1825 kcal, 70 g protein, 2200 mL fluids  Pt will consuem > 75% of meals at follow up 7-10 days  LOS 20
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
## 2020-02-07 NOTE — GROUP NOTE
GRACIE  GROUP DOCUMENTATION INDIVIDUAL Group Therapy Note Date: 2/7/2020 Group Start Time: 1100 Group End Time: 1200 Group Topic: Topic Group [address] ACUTE BEHAV [facility], [address] GROUP DOCUMENTATION GROUP Group Therapy Note Attendees: 8 Attendance: Attended Patient's Goal:  To identify people and things most grateful for Interventions/techniques: Supported-worksheet Follows Directions: Followed directions Interactions: Interacted appropriately Mental Status: Calm Behavior/appearance: Attentive and Cooperative Goals Achieved: Able to engage in interactions, Able to listen to others, Able to self-disclose and Discussed coping Additional Notes:   
 
Timi Wise

## 2020-02-07 NOTE — PROGRESS NOTES
0800 Pt ate breakfast then moved to her room to exercise. Angry/agitated. Talking to self, gesturing frequently as if she is talking to someone else. Pt does not want to expand on what is bothering her when addressed directly. Denies all issues, blames the staff. Mentioning to herself that we need to call and talk to her mother. Cooperative with scheduled medication. 0930 Standing in the hallway talking to self. Refusing to talk to the doctor at this time. 1100 Staff spoke with pt's mother. Pt agreed to meet with treatment team. Pt remains agitated. 1400 Standing in the hallway talking with peer. Appears to have calmed down greatly. No longer having conversations with herself.    1600 Sitting in the dayroom attending group. No agitation noted. 1800 Awake in room. Cooperative with evening medication. No issues voiced.

## 2020-02-07 NOTE — PROGRESS NOTES
Problem: Anxiety-Behavioral Health (Adult/Pediatric)  Goal: *STG: Remains safe in hospital  Outcome: Progressing Towards Goal     Problem: Anxiety-Behavioral Health (Adult/Pediatric)  Goal: *STG: Seeks staff when feelings of anxiety and fear arise  Outcome: Not Progressing Towards Goal     Problem: Anxiety-Behavioral Health (Adult/Pediatric)  Goal: *STG: Attends activities and groups  Outcome: Progressing Towards Goal     Problem: Anxiety-Behavioral Health (Adult/Pediatric)  Goal: *STG: Demonstrates effective anxiety reduction strategies  Outcome: Progressing Towards Goal     Problem: Anxiety-Behavioral Health (Adult/Pediatric)  Goal: *STG/LTG: Complies with medication therapy  Outcome: Progressing Towards Goal   1905- Report given by Xiao Gonzales, I assume care of the patient. Patient is asleep in her room. 2010- Patient is awake and denies SI/HI/AH/VH, patient seems to be isolative, patient encouraged to come out of her room and eat. 2030- Patient is eating snack in the day room and return back to her room. 2050- Patient is sitting in the day room talking to peer. 2130- Patient is compliant with medication. Patient is given Atarax and Trazodone for safety. 2200- Patient is talking to peer in the hallway. 0030- Patient is asleep in the room. 0200- Patient is asleep in the room. 0400- Patient is asleep in the room. 0600- Patient slept approx. 8 hrs.

## 2020-02-07 NOTE — PROGRESS NOTES
Problem: Anxiety-Behavioral Health (Adult/Pediatric)  Goal: *STG: Remains safe in hospital  Outcome: Progressing Towards Goal  Goal: *STG: Attends activities and groups  Outcome: Progressing Towards Goal  Goal: *STG/LTG: Complies with medication therapy  Outcome: Progressing Towards Goal     Progressing.

## 2020-02-07 NOTE — GROUP NOTE
GRACIE  GROUP DOCUMENTATION INDIVIDUAL Group Therapy Note Date: 2/7/2020 Group Start Time: 1500 Group End Time: 7869 Group Topic: Recreational/Music Therapy [facility] [address] ACUTE BEHAV [facility], [address] GROUP DOCUMENTATION GROUP Group Therapy Note Attendees: 5 Attendance: Attended Patient's Goal:  To concentrate on selected task Interventions/techniques: Supported-crafts,games,music Follows Directions: Followed directions Interactions: Interacted appropriately Mental Status: Calm and Flat Behavior/appearance: Attentive and Cooperative Goals Achieved: Able to engage in interactions and Able to listen to others-completed task Additional Notes:   
 
Roberto Larson

## 2020-02-07 NOTE — PROGRESS NOTES
Behavioral Health Interdisciplinary Rounds     Patient Name: Cecilia Mehta  Age: 21 y.o. Room/Bed:  313/02  Primary Diagnosis: Schizophrenia, paranoid ([facility] [address]   Admission Status: Voluntary     Readmission within 30 days: no  Power of  in place: no  Patient requires a blocked bed: no            Reason for blocked bed:   Order for blocked bed obtained: no       Sleep hours:  8 hrs      Participation in Care/Groups:  yes  Medication Compliant?: Yes  PRNS (last 24 hours): Antianxiety and Sleep Aid    Restraints (last 24 hours):  no  Substance Abuse:  no    24 hour chart check complete: yes       Patient goal(s) for today: Engage in unit activities. Treatment team focus/goals: Dispo planning. Progress note:  Pt 's mood is labile. Pt remains discharge focused and open to stepping down to a CSU. Pt has been accepted by [facility] and will discharge Monday.      LOS:  5                        Expected LOS: 2/10     Financial concerns/prescription coverage:  medicaid   Date of last family contact: Unable to leave voicemail for mother 2/6 - Mcmahon Alla 1-36-0                           Family requesting physician contact today: No  Discharge plan: TBD  Guns in the home: None involved.                                                       Outpatient provider(s):  To be linked      Participating treatment team Jonas Davenport MSW and Dr. Rahul Cruz

## 2020-02-08 PROCEDURE — 65220000003 HC RM SEMIPRIVATE PSYCH

## 2020-02-08 PROCEDURE — 74011250637 HC RX REV CODE- 250/637: Performed by: PSYCHIATRY & NEUROLOGY

## 2020-02-08 RX ADMIN — OXCARBAZEPINE 300 MG: 150 TABLET, FILM COATED ORAL at 17:28

## 2020-02-08 RX ADMIN — QUETIAPINE FUMARATE 600 MG: 300 TABLET, EXTENDED RELEASE ORAL at 21:32

## 2020-02-08 RX ADMIN — SENNOSIDES AND DOCUSATE SODIUM 1 TABLET: 8.6; 5 TABLET ORAL at 08:40

## 2020-02-08 RX ADMIN — OXCARBAZEPINE 300 MG: 150 TABLET, FILM COATED ORAL at 08:40

## 2020-02-08 NOTE — BH NOTES
[id number] Report received from Payal Parnell 1772 Patient seen in the dayroom watching tv; patient stated she had a good day, she denies SI/HI and AVH    2115 patient given regular scheduled medication    2136 patient given trazodone for sleep    Patient has been resting this shift    Hourly Rounds completed, patient slept approx 8 hrs

## 2020-02-08 NOTE — BH NOTES
Behavioral Health Interdisciplinary Rounds     Patient Name: Jennifer Limon  Age: 21 y.o.   Room/Bed:  313/02  Primary Diagnosis: [facility]ia, [facility]   Admission Status: Voluntary     Readmission within 30 days: no  Power of  in place: no  Patient requires a blocked bed: no          Reason for blocked bed:     Sleep hours: 8       Participation in Care/Groups:  yes  Medication Compliant?: Yes  PRNS (last 24 hours): Sleep Aid    Restraints (last 24 hours):  no  Substance Abuse:  no    24 hour chart check complete: yes     Patient goal(s) for today:   Treatment team focus/goals:   Progress note:     LOS:  6  Expected LOS:     Financial concerns/prescription coverage:  no  Family contact:       Family requesting physician contact today:  no  Discharge plan:   Access to weapons: no        Outpatient provider(s):   Patient's preferred phone number for follow up call:     Participating treatment team members: Jennifer Limon, (assigned SW),

## 2020-02-08 NOTE — PROGRESS NOTES
Problem: Anxiety-Behavioral Health (Adult/Pediatric)  Goal: *STG: Remains safe in hospital  Outcome: Progressing Towards Goal  Goal: *STG: Attends activities and groups  Outcome: Progressing Towards Goal  Goal: *STG/LTG: Complies with medication therapy  Outcome: Progressing Towards Goal
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
## 2020-02-08 NOTE — GROUP NOTE
[facility] GROUP DOCUMENTATION INDIVIDUAL Group Therapy Note Date: 2/8/2020 Group Start Time: 6999 Group End Time: 7802 Group Topic: Social Work Group [facility] GROUP DOCUMENTATION GROUP Group Therapy Note Attendees: 6 Attendance: Attended Patient's Goal:   To be able to identify the cause of negative and positive emotions and learn to better process them in a constructive manner. Interventions/techniques: Art integration, Challenged, Informed and Promoted peer support Follows Directions: Followed directions Interactions: Interacted appropriately Mental Status: Calm and Delusions Behavior/appearance: Attentive and Cooperative Goals Achieved: Able to engage in interactions, Able to listen to others and Able to manage/cope with feelings Additional Notes:  Pt stated that she \"doesn't get sad about anything\" and that she \"keeps pushing\" when times get hard. Pt's speech was disorganized, but calm. Charlotte Patrick

## 2020-02-08 NOTE — PROGRESS NOTES
Problem: Anxiety-Behavioral Health (Adult/Pediatric)  Goal: *STG: Remains safe in hospital  Outcome: Progressing Towards Goal  Goal: *STG: Attends activities and groups  Outcome: Progressing Towards Goal  Goal: *STG: Demonstrates decrease in ritualistic behavior  Outcome: Progressing Towards Goal  Goal: *STG: Demonstrates effective anxiety reduction strategies  Outcome: Progressing Towards Goal  Goal: *STG/LTG: Complies with medication therapy  Outcome: Progressing Towards Goal     Problem: Paranoid Ideation  Goal: *LTG: Interact with others without defensiveness or anger  Outcome: Progressing Towards Goal  Goal: *STG: Stop being accusatory of others  Outcome: Progressing Towards Goal     Progressing.

## 2020-02-08 NOTE — PROGRESS NOTES
0800 Sitting in the dayroom for breakfast. Pleasant mood, calm. \"I'm just a little bit depressed. \" Denies S/I, H/I, AV hallucinations, and anxiety. Cooperative with medications. 1030 Resting in bed, appears to be sleeping. 1230 In the dayroom. Ate lunch. Having a loud conversation with a female peer, \"All boys cheat. All of them. \" Quickly calmed down. 1500 Resting in bed with eyes closed. 1800 Remains in bed.

## 2020-02-09 PROCEDURE — 74011250637 HC RX REV CODE- 250/637: Performed by: PSYCHIATRY & NEUROLOGY

## 2020-02-09 PROCEDURE — 65220000003 HC RM SEMIPRIVATE PSYCH

## 2020-02-09 RX ADMIN — OXCARBAZEPINE 300 MG: 150 TABLET, FILM COATED ORAL at 08:43

## 2020-02-09 RX ADMIN — SENNOSIDES AND DOCUSATE SODIUM 1 TABLET: 8.6; 5 TABLET ORAL at 08:43

## 2020-02-09 RX ADMIN — OXCARBAZEPINE 300 MG: 150 TABLET, FILM COATED ORAL at 16:21

## 2020-02-09 RX ADMIN — QUETIAPINE FUMARATE 600 MG: 300 TABLET, EXTENDED RELEASE ORAL at 21:09

## 2020-02-09 NOTE — INTERDISCIPLINARY ROUNDS
Behavioral Health Interdisciplinary Rounds  
  
Patient Name: Jennifer Limon                     Age: 21 y.o. Room/Bed:  313/02 Primary Diagnosis: [facility], [facility] Admission Status: Voluntary Readmission within 30 days: no 
Power of  in place: no 
Patient requires a blocked bed: yes          Reason for blocked bed: psychosis/agitation 
  
Sleep hours:       7 1/2 Participation in Care/Groups:  yes Medication Compliant?: Yes PRNS (last 24 hours):  none Restraints (last 24 hours):  no 
Substance Abuse:  no              
24 hour chart check complete: yes  
  
Patient goal(s) for today:  
Treatment team focus/goals:  
Progress note:  
  
LOS:                          Expected LOS:  
  
Financial concerns/prescription coverage:  no 
Family contact:                                     
Family requesting physician contact today:  no 
Discharge plan: Access to weapons: no Outpatient provider(s):  
Patient's preferred phone number for follow up call:  
  
Participating treatment team members: Jennifer Limon (assigned SW),

## 2020-02-09 NOTE — PROGRESS NOTES
Problem: Paranoid Ideation  Goal: *LTG: Interact with others without defensiveness or anger  Outcome: Progressing Towards Goal
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
## 2020-02-09 NOTE — PROGRESS NOTES
0800 Standing up in the dayroom, ate breakfast. Watching TV. No complaints. When asked about depression and anxiety, \"No not really. \" Denies S/I, H/I, and AV hallucinations. Cooperative with medications. 1030 Resting in bed with eyes closed. 1230 Pt ate lunch. Social with peers. 1500 Exercising in her room. Running in place, situps. No issues voiced. 1800 Sitting in the dayroom watching television talking with peers. Cooperative with evening medication.

## 2020-02-09 NOTE — BH NOTES
1930  Received report from REID DAMON. Assumed care of the patient. 2000  Pt in day flynn socializing with peers. Pt stated her mood was calm and she currently denies anxiety/depression/SI/HI/AVH/pain.       2030  Pt in day flynn and ate evening snack    2100  Pt in day flynn    2200  Pt in day flynn    2300  Pt in bed asleep    0000  Pt in bed asleep    0100  Pt in bed asleep    0200  Pt in bed asleep    0300  Pt in bed asleep    0400  Pt in bed asleep    0500  Pt in bed asleep    0600  Pt in bed asleep    0630  Pt slept approximately 7 1/2 hours so far this shift

## 2020-02-09 NOTE — GROUP NOTE
GRACIE  GROUP DOCUMENTATION INDIVIDUAL Group Therapy Note Date: 2/9/2020 Group Start Time: 0900 Group End Time: 3142 Group Topic: Discharge Planning [facility] Gretchen Sheridan [facility] GROUP DOCUMENTATION GROUP Group Therapy Note Attendees: 5 Attendance: Attended Patient's Goal:  To \"process the process\" of discharge and make steps to further their recovery after discharge. To identify triggers and pitfalls of recovery to make a better plan moving forward. Interventions/techniques: Challenged, Informed and Validated Follows Directions: Followed directions Interactions: Interacted appropriately Mental Status: Anxious and Blunted Behavior/appearance: Attentive Goals Achieved: Able to engage in interactions Additional Notes:  Pt interacted well with peers. Pt stated \"I just need to get somewhere where I can trust people around me. \"  
 
Milvia Garcia

## 2020-02-09 NOTE — PROGRESS NOTES
Problem: Anxiety-Behavioral Health (Adult/Pediatric)  Goal: *STG: Demonstrates decrease in ritualistic behavior  Outcome: Progressing Towards Goal  Goal: *STG: Demonstrates effective anxiety reduction strategies  Outcome: Progressing Towards Goal  Goal: *STG/LTG: Complies with medication therapy  Outcome: Progressing Towards Goal     Problem: Paranoid Ideation  Goal: *LTG: Interact with others without defensiveness or anger  Outcome: Progressing Towards Goal  Goal: *STG: Stop being accusatory of others  Outcome: Progressing Towards Goal     Progressing.

## 2020-02-10 VITALS
OXYGEN SATURATION: 100 % | BODY MASS INDEX: 36.89 KG/M2 | HEART RATE: 79 BPM | HEIGHT: 68 IN | DIASTOLIC BLOOD PRESSURE: 72 MMHG | SYSTOLIC BLOOD PRESSURE: 109 MMHG | TEMPERATURE: 98.4 F | WEIGHT: 243.4 LBS | RESPIRATION RATE: 18 BRPM

## 2020-02-10 PROCEDURE — 74011250637 HC RX REV CODE- 250/637: Performed by: PSYCHIATRY & NEUROLOGY

## 2020-02-10 RX ADMIN — SENNOSIDES AND DOCUSATE SODIUM 1 TABLET: 8.6; 5 TABLET ORAL at 08:08

## 2020-02-10 RX ADMIN — OXCARBAZEPINE 300 MG: 150 TABLET, FILM COATED ORAL at 08:08
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
## 2020-02-10 NOTE — DISCHARGE INSTRUCTIONS
Patient Education      If I feel I am at risk of hurting myself or others, I will call the crisis office and speak with a crisis worker who will assist me during my crisis. [address]  [phone]  [facility] [place] [address] [phone]  [facility]-  [phone]    Schizophrenia: Care Instructions  Your Care Instructions  Schizophrenia is a disease that makes it hard to think clearly, manage emotions, and interact with other people. It can cause:  · Delusions. These are beliefs that are not real.  · Hallucinations. These are things that you may see or hear that are not really there. · Paranoia. This is the belief that others are lying, cheating, using you, or trying to harm you. The disease may change your ability to enjoy life, express emotions, or function. At times, you may hear voices, behave strangely, have trouble speaking or understanding speech, or keep to yourself. The goal of treatment is to lower your stress and help your brain function normally. You may need lifelong treatment with medicines and counseling to keep your schizophrenia under control. When schizophrenia is not treated, the risks are higher for suicide, a hospital stay, and other problems. Early treatment called coordinated specialty care [facility]) may help a person who is having his or her first episode of psychotic thoughts. Ask your doctor about [address]. Follow-up care is a key part of your treatment and safety. Be sure to make and go to all appointments, and call your doctor if you are having problems. It's also a good idea to know your test results and keep a list of the medicines you take. How can you care for yourself at home? · Be safe with medicines. Take your medicines exactly as prescribed. Call your doctor if you think you are having a problem with your medicine.  When you feel good, you may think that you do not need your medicines. But it is important to keep taking them as scheduled. · Go to your counseling sessions. Call and talk with your counselor if you can't attend or if you don't think the sessions are helping. Do not just stop going. · Eat a healthy diet. Talk with a dietitian about what type of diet may be best for you. · Do not use alcohol or illegal drugs. · Keep the numbers for these national suicide hotlines: 2-621-481-TALK (4-861.819.9606) and 6-134-QAALVXM (2-359.930.9578). If you or someone you know talks about suicide or feeling hopeless, get help right away. What should you do if someone in your family has schizophrenia? · Learn about the disease and how it may get worse over time. · Remind your family member that you love him or her. · Make a plan with all family members about how to take care of your loved one when his or her symptoms are bad. · Talk about your fears and concerns and those of other family members. Seek counseling if needed. · Do not focus attention only on the person who is in treatment. · Remind yourself that it will take time for changes to occur. · Do not blame yourself for the disease. · Know your legal rights and the legal rights of your family member. · Take care of yourself. Stay involved with your own interests, such as your career, hobbies, and friends. Use exercise, positive self-talk, relaxation, and deep breathing to help lower your stress. · Give yourself time to grieve. You may need to deal with emotions such as anger, fear, and frustration. After you work through your feelings, you will be better able to care for yourself and your family. · If you are having a hard time with your feelings and your interactions with your family member, talk with a counselor. When should you call for help? Call 911 anytime you think you may need emergency care.  For example, call if:    · You are thinking about suicide or are threatening suicide.     · You feel like hurting yourself or someone else.    Call your doctor now or seek immediate medical care if:    · You hear voices.     · You think someone is trying to harm you.     · You cannot concentrate or are easily confused.    Watch closely for changes in your health, and be sure to contact your doctor if:    · You are having trouble taking care of yourself.     · You cannot attend your counseling sessions. Where can you learn more? Go to http://radha-jose.info/. Enter X885 in the search box to learn more about \"Schizophrenia: Care Instructions. \"  Current as of: May 28, 2019  Content Version: 12.2  © 5088-7260 [employer]. Care instructions adapted under license by [employer] (which disclaims liability or warranty for this information). If you have questions about a medical condition or this instruction, always ask your healthcare professional. [place] [address] any warranty or liability for your use of this information. DISCHARGE SUMMARY    Lottie Hyder  : 2000  MRN: [id number]    The patient Tank Causey exhibits the ability to control behavior in a less restrictive environment. Patient's level of functioning is improving. No assaultive/destructive behavior has been observed for the past 24 hours. No suicidal/homicidal threat or behavior has been observed for the past 24 hours. There is no evidence of serious medication side effects. Patient has not been in physical or protective restraints for at least the past 24 hours. If weapons involved, how are they secured? None involved. Is patient aware of and in agreement with discharge plan? Yes. Arrangements for medication:  Prescriptions given to patient. Copy of discharge instructions to provider?:  Yes. Arrangements for transportation home:  [facility] worker to take patient to [place] [address].      Keep all follow up appointments as scheduled, continue to take prescribed medications per physician instructions. Mental health crisis number:  791 or your local mental health crisis line number at [phone].

## 2020-02-10 NOTE — BH NOTES
1930  Received report from REID DAMON. Assumed care of the patient. 2000  Pt reports her mood as calm this evening. Pt pleasant and cooperative upon assessment. Pt denies anxiety/depression/SI/HI/AVH/pain. Pt in day flynn socializing with peers    2030  Pt in day flynn for evening snack      2100  Pt in day flynn socializing with peers. Pt compliant with evening medication    2200  Pt awake in her room    2300  Pt in bed asleep    0000  Pt in bed asleep    0100  Pt in bed asleep    0200  Pt in bed asleep    0300  Pt in bed asleep    0400  Pt in bed asleep    0500  Pt in bed asleep    0600  Pt in bed asleep    0630  Pt slept 7 1/2 hours this shift.

## 2020-02-10 NOTE — PROGRESS NOTES
Problem: Anxiety-Behavioral Health (Adult/Pediatric)  Goal: *STG: Remains safe in hospital  Outcome: Progressing Towards Goal  Goal: *STG: Seeks staff when feelings of anxiety and fear arise  Outcome: Progressing Towards Goal  Goal: *STG: Attends activities and groups  Outcome: Progressing Towards Goal  Goal: *STG: Demonstrates decrease in ritualistic behavior  Outcome: Progressing Towards Goal  Goal: *STG/LTG: Complies with medication therapy  Outcome: Progressing Towards Goal

## 2020-02-10 NOTE — INTERDISCIPLINARY ROUNDS
Behavioral Health Interdisciplinary Rounds  
  
Patient Bri Archer: 20 y.o.                 Room/Bed:  313/02 Primary Diagnosis: Schizophrenia, paranoid (HCC)       
Admission Status: Voluntary                            
Readmission within 30 days: no Power of  in place: no Patient requires a blocked bed: yes          Reason for blocked bed: psychosis/agitation 
  
Sleep hours:                                                   
Participation in Care/Groups:  yes Medication Compliant?: Yes PRNS (last 24 hours):  none                    
Restraints (last 24 hours):  no Substance Abuse:  no              
24 hour chart check complete: yes  
  
Patient goal(s) for today:  
Treatment team focus/goals:  
Progress note:  
  
LOS:                          Expected LOS:  
  
Financial concerns/prescription coverage:  no Family contact:                                     
Family requesting physician contact today:  no 
Discharge plan: Access to weapons: no                                                            
Outpatient provider(s):  
Patient's preferred phone number for follow up call:  
  
Participating treatment team Sharan Stevens (assigned SW),

## 2020-02-10 NOTE — BH NOTES
Behavioral Health Transition Record to Provider    Patient Name: Mitchell Dobbins  YOB: 2000  Medical Record Number: [id number]  Date of Admission: 2/1/2020  Date of Discharge: 2/10/2020    Attending Provider: Adriano Lyn MD  Discharging Provider: Adriano Lyn MD  To contact this individual call [phone] and ask the  to page. If unavailable, ask to be transferred to [facility] Provider on call. [facility] Provider will be available on call 24/7 and during holidays. Primary Care Provider: None    No Known Allergies    Reason for Admission: psychosis     Admission Diagnosis: Schizophrenia, paranoid ([facility] [address] [F20.0]    * No surgery found *    Results for orders placed or performed during the hospital encounter of 02/01/20   LIPID PANEL   Result Value Ref Range    LIPID PROFILE          Cholesterol, total 147 <200 MG/DL    Triglyceride 62 <150 MG/DL    HDL Cholesterol 54 MG/DL    LDL, calculated 80.6 0 - 100 MG/DL    VLDL, calculated 12.4 MG/DL    CHOL/HDL Ratio 2.7 0.0 - 5.0     TSH 3RD GENERATION   Result Value Ref Range    TSH 1.76 0.36 - 3.74 uIU/mL       Immunizations administered during this encounter: There is no immunization history for the selected administration types on file for this patient. Screening for Metabolic Disorders for Patients on Antipsychotic Medications  (Data obtained from the EMR)    Estimated Body Mass Index  Estimated body mass index is 37.01 kg/m² as calculated from the following:    Height as of this encounter: 5' 8\" (1.727 m). Weight as of this encounter: 110.4 kg (243 lb 6.4 oz).      Vital Signs/Blood Pressure  Visit Vitals  /72 (BP 1 Location: Right arm, BP Patient Position: Sitting)   Pulse 79   Temp 98.4 °F (36.9 °C)   Resp 18   Ht 5' 8\" (1.727 m)   Wt 110.4 kg (243 lb 6.4 oz)   SpO2 100%   Breastfeeding No   BMI 37.01 kg/m²       Blood Glucose/Hemoglobin A1c  No results found for: GLU, GLUCPOC    No results found for: HBA1C, HGBE8, EUC2LASD     Lipid Panel  Lab Results   Component Value Date/Time    Cholesterol, total 147 02/04/2020 05:15 AM    HDL Cholesterol 54 02/04/2020 05:15 AM    LDL, calculated 80.6 02/04/2020 05:15 AM    Triglyceride 62 02/04/2020 05:15 AM    CHOL/HDL Ratio 2.7 02/04/2020 05:15 AM        Discharge Diagnosis: Please refer to physician's discharge summary. Discharge Plan: The patient Terrence Mcnally exhibits the ability to control behavior in a less restrictive environment. Patient's level of functioning is improving. No assaultive/destructive behavior has been observed for the past 24 hours. No suicidal/homicidal threat or behavior has been observed for the past 24 hours. There is no evidence of serious medication side effects. Patient has not been in physical or protective restraints for at least the past 24 hours. If weapons involved, how are they secured? None involved. Is patient aware of and in agreement with discharge plan? Yes. Arrangements for medication:  Prescriptions given to patient. Copy of discharge instructions to provider?:  Yes. Arrangements for transportation home:  [facility] worker to take patient to [place] [address]. Keep all follow up appointments as scheduled, continue to take prescribed medications per physician instructions. Mental health crisis number:  484 or your local mental health crisis line number at [phone]. Discharge Medication List and Instructions:   Discharge Medication List as of 2/10/2020 11:22 AM      START taking these medications    Details   QUEtiapine SR (SEROQUEL XR) 300 mg sr tablet Take 2 Tabs by mouth nightly. Indications: schizophrenia, Print, Disp-60 Tab, R-1      senna-docusate (PERICOLACE) 8.6-50 mg per tablet Take 1 Tab by mouth daily. Indications: constipation, Print, Disp-30 Tab, R-1      hydroxyzine HCL (ATARAX) 50 mg tablet Take 1 Tab by mouth two (2) times daily as needed for Anxiety for up to 60 days.  Indications: anxious, Print, Disp-60 Tab, R-1         CONTINUE these medications which have CHANGED    Details   ARIPiprazole lauroxil (ARISTADA) 882 mg/3.2 mL injection 3.2 mL by IntraMUSCular route every thirty (30) days. Dose is due 2/21/2020 +/- 3 days  Indications: schizophrenia, Print, Disp-1 Syringe, R-0      OXcarbazepine (TRILEPTAL) 300 mg tablet Take 1 Tab by mouth two (2) times a day. Indications: schizophrenia, Print, Disp-60 Tab, R-1      traZODone (DESYREL) 50 mg tablet Take 1 Tab by mouth nightly as needed for Sleep (For insomnia). Indications: insomnia associated with depression, Print, Disp-30 Tab, R-1         STOP taking these medications       QUEtiapine (SEROQUEL) 300 mg tablet Comments:   Reason for Stopping:         sertraline (ZOLOFT) 50 mg tablet Comments:   Reason for Stopping:         hydrOXYzine pamoate (VISTARIL) 25 mg capsule Comments:   Reason for Stopping:               Unresulted Labs (24h ago, onward)    None        To obtain results of studies pending at discharge, please contact N/A. Follow-up Information     Follow up With Specialties Details Why Contact Info    [facility]  Go today  This is the crisis stabilization unit you will be stepping down to today. After completion of the crisis program you will qualify for mental health skill building services. Address: [address], [facility], [address]  Phone: [phone]    [facility]  Go in 1 day Please attend appointment for ongoing mental health case management, therapy and medication management. Please inquire about medical services through the [facility] clinic. [address] [place] [address]   Address: [address] [place] [address], [address] [place] [address]  Phone: [phone]  Fax: [phone]  Rapid access appointment hours:  Monday - Friday: 8:00 AM - 2:00 PM     Aristada 882 mg IM injection due on 2/21/20. On 2/21/2020 Aristada 882 mg IM injection due on 2/21/20.  Received Aristada Initio 675 mg and Aristada 882 mg IM injection on 1/23/20. Advanced Directive:   Does the patient have an appointed surrogate decision maker? Unknown   Does the patient have a Medical Advance Directive? Unknown   Does the patient have a Psychiatric Advance Directive? Unknown   If the patient does not have a surrogate or Medical Advance Directive AND Psychiatric Advance Directive, the patient was offered information on these advance directives. Unknown     Patient Instructions: Please continue all medications until otherwise directed by physician. Tobacco Cessation Discharge Plan:   Is the patient a smoker and needs referral for smoking cessation? No  Patient referred to the following for smoking cessation with an appointment? No   Patient was offered medication to assist with smoking cessation at discharge? No  Was education for smoking cessation added to the discharge instructions? No     Alcohol/Substance Abuse Discharge Plan:   Does the patient have a history of substance/alcohol abuse and requires a referral for treatment? Yes  Patient referred to the following for substance/alcohol abuse treatment with an appointment? Yes  Patient was offered medication to assist with alcohol cessation at discharge? No  Was education for substance/alcohol abuse added to discharge instructions? Yes     Patient discharged to Home; provided to the patient/caregiver either in hard copy or electronically. Continuing care paperwork was faxed to community mental health providers.

## 2020-02-10 NOTE — PROGRESS NOTES
Problem: Anxiety-Behavioral Health (Adult/Pediatric)  Goal: *STG: Remains safe in hospital  Outcome: Progressing Towards Goal  Goal: *STG: Attends activities and groups  Outcome: Progressing Towards Goal  Goal: *STG: Demonstrates decrease in ritualistic behavior  Outcome: Progressing Towards Goal     Problem: Paranoid Ideation  Goal: *LTG: Interact with others without defensiveness or anger  Outcome: Progressing Towards Goal  Goal: *LTG: Verbalize trust of significant other & eliminate accusations of disloyalty  Outcome: Progressing Towards Goal     Problem: Anxiety-Behavioral Health (Adult/Pediatric)  Goal: *STG/LTG: Complies with medication therapy  Outcome: Resolved/Met

## 2020-02-10 NOTE — BH NOTES
Report recieved from Leo Thornton RN. Assumed care of the patient. Patient in room, seems to be organizing room. 2497  Patient in hallway. Patient observed to be seen responding to possible internal stimuli. Patient excited on discharge today. Patient denies HI/SI/AVH, med complaint with morning medications. Patient calm and cooperative with shift assessment. Patient seen in dayroom eating breakfast and socializing with peers. 1043  Patient attending discharge planning group with Mackenzie Simon, [address] [place] [address].    1225  Patient discharged to [place] [address]. Patient given and signed for all belongings and valuables. Patient given discharge instructions, including medications and follow up appointments. Patient verbalized understanding.

## 2020-02-10 NOTE — PROGRESS NOTES
Pharmacist Discharge Medication Reconciliation    Significant PMH: No past medical history on file. Chief Complaint for this Admission: No chief complaint on file. Allergies: Patient has no known allergies. Discharge Medications:   Current Discharge Medication List        START taking these medications    Details   QUEtiapine SR (SEROQUEL XR) 300 mg sr tablet Take 2 Tabs by mouth nightly. Indications: schizophrenia  Qty: 60 Tab, Refills: 1      senna-docusate (PERICOLACE) 8.6-50 mg per tablet Take 1 Tab by mouth daily. Indications: constipation  Qty: 30 Tab, Refills: 1      hydroxyzine HCL (ATARAX) 50 mg tablet Take 1 Tab by mouth two (2) times daily as needed for Anxiety for up to 60 days. Indications: anxious  Qty: 60 Tab, Refills: 1           CONTINUE these medications which have CHANGED    Details   ARIPiprazole lauroxil (ARISTADA) 882 mg/3.2 mL injection 3.2 mL by IntraMUSCular route every thirty (30) days. Dose is due 2/21/2020 +/- 3 days  Indications: schizophrenia  Qty: 1 Syringe, Refills: 0      OXcarbazepine (TRILEPTAL) 300 mg tablet Take 1 Tab by mouth two (2) times a day. Indications: schizophrenia  Qty: 60 Tab, Refills: 1      traZODone (DESYREL) 50 mg tablet Take 1 Tab by mouth nightly as needed for Sleep (For insomnia). Indications: insomnia associated with depression  Qty: 30 Tab, Refills: 1           STOP taking these medications       QUEtiapine (SEROQUEL) 300 mg tablet Comments:   Reason for Stopping:         sertraline (ZOLOFT) 50 mg tablet Comments:   Reason for Stopping:         hydrOXYzine pamoate (VISTARIL) 25 mg capsule Comments:   Reason for Stopping:               The patient's chart, MAR and AVS were reviewed by Danny Martin.     Discharging Provider: Dr. Yaneth Trivedi    Thank Baltazar Field